# Patient Record
Sex: MALE | Race: WHITE | NOT HISPANIC OR LATINO | Employment: OTHER | URBAN - METROPOLITAN AREA
[De-identification: names, ages, dates, MRNs, and addresses within clinical notes are randomized per-mention and may not be internally consistent; named-entity substitution may affect disease eponyms.]

---

## 2024-03-24 DIAGNOSIS — E78.5 HYPERLIPIDEMIA, UNSPECIFIED HYPERLIPIDEMIA TYPE: Primary | ICD-10-CM

## 2024-03-25 RX ORDER — ATORVASTATIN CALCIUM 80 MG/1
80 TABLET, FILM COATED ORAL NIGHTLY
Qty: 90 TABLET | Refills: 3 | Status: SHIPPED | OUTPATIENT
Start: 2024-03-25

## 2024-07-11 ENCOUNTER — OFFICE VISIT (OUTPATIENT)
Dept: CARDIOLOGY | Facility: HOSPITAL | Age: 74
End: 2024-07-11
Payer: MEDICARE

## 2024-07-11 VITALS
DIASTOLIC BLOOD PRESSURE: 83 MMHG | SYSTOLIC BLOOD PRESSURE: 137 MMHG | BODY MASS INDEX: 25.2 KG/M2 | WEIGHT: 180 LBS | HEIGHT: 71 IN | HEART RATE: 73 BPM | OXYGEN SATURATION: 93 %

## 2024-07-11 DIAGNOSIS — E78.5 HYPERLIPIDEMIA, UNSPECIFIED HYPERLIPIDEMIA TYPE: Chronic | ICD-10-CM

## 2024-07-11 DIAGNOSIS — I25.2 HISTORY OF MYOCARDIAL INFARCTION: Primary | Chronic | ICD-10-CM

## 2024-07-11 PROCEDURE — G2211 COMPLEX E/M VISIT ADD ON: HCPCS | Performed by: INTERNAL MEDICINE

## 2024-07-11 PROCEDURE — 93005 ELECTROCARDIOGRAM TRACING: CPT | Performed by: INTERNAL MEDICINE

## 2024-07-11 PROCEDURE — 1036F TOBACCO NON-USER: CPT | Performed by: INTERNAL MEDICINE

## 2024-07-11 PROCEDURE — 99213 OFFICE O/P EST LOW 20 MIN: CPT | Performed by: INTERNAL MEDICINE

## 2024-07-11 PROCEDURE — 1159F MED LIST DOCD IN RCRD: CPT | Performed by: INTERNAL MEDICINE

## 2024-07-11 NOTE — PROGRESS NOTES
"Chief Complaint:   No chief complaint on file.     History Of Present Illness:    Luiz Banks is a 73 y.o. male here for followup. Hospitalized 9/2019 with an acute MI complicated by high grade AV block. Angiography was remarkable for RCA occlusion along with severe mid LAD disease. There was a heavy thrombus burden in his RCA which was dissolved by use of several days combination IIb/IIIa inhibitor, DAPT, and heparinization with subsequent PCI of his RCA and LAD. He was discharged on clopidogrel given dyspnea with ticagrelor. He did not tolerate Toprol.        He reports doing well and offers no cardiac complaints. Continues to bike several miles a week.         Last Recorded Vitals:  Vitals:    07/11/24 1316   BP: 137/83   Pulse: 73   SpO2: 93%   Weight: 81.6 kg (180 lb)   Height: 1.803 m (5' 11\")             Allergies:  Patient has no allergy information on record.    Outpatient Medications:  Current Outpatient Medications   Medication Instructions    atorvastatin (LIPITOR) 80 mg, oral, Nightly         Physical Exam:  Gen Well appearing septuagenarian male sitting up in NAD. Body mass index is 25.1 kg/m².   CV rrr. No m/r/g appreciated. No JVD or leg edema.   Pulm Lungs clear with normal respiratory effort.  Neuro Alert and conversant. Grossly nonfocal.       I reviewed the patient's ECG -  NSR. Inferior infarct.     I reviewed most recent imaging / labs / and office notes.      Assessment/Plan   1. CAD s/p MI s/p LAD/RCA PCI '19  Stable by available metrics. Indefinite aspirin / statin. Continued exercise encouraged.     2. Hyperlipidemia  On high dose statin. Last LDL at goal.     3. Health maintenance  PCP followup encouraged.     Followup 1 year                Shravan Ontiveros MD   "

## 2024-07-12 LAB
ATRIAL RATE: 73 BPM
P AXIS: 59 DEGREES
P OFFSET: 159 MS
P ONSET: 116 MS
PR INTERVAL: 190 MS
Q ONSET: 211 MS
QRS COUNT: 12 BEATS
QRS DURATION: 98 MS
QT INTERVAL: 388 MS
QTC CALCULATION(BAZETT): 427 MS
QTC FREDERICIA: 414 MS
R AXIS: -13 DEGREES
T AXIS: 58 DEGREES
T OFFSET: 405 MS
VENTRICULAR RATE: 73 BPM

## 2024-12-27 ENCOUNTER — APPOINTMENT (OUTPATIENT)
Dept: PRIMARY CARE | Facility: CLINIC | Age: 74
End: 2024-12-27
Payer: MEDICARE

## 2024-12-27 VITALS — HEART RATE: 96 BPM | OXYGEN SATURATION: 96 % | DIASTOLIC BLOOD PRESSURE: 76 MMHG | SYSTOLIC BLOOD PRESSURE: 130 MMHG

## 2024-12-27 DIAGNOSIS — Z48.02 VISIT FOR SUTURE REMOVAL: ICD-10-CM

## 2024-12-27 DIAGNOSIS — S01.01XA LACERATION OF SCALP, INITIAL ENCOUNTER: Primary | ICD-10-CM

## 2024-12-27 DIAGNOSIS — Z48.02 ENCOUNTER FOR STAPLE REMOVAL: ICD-10-CM

## 2024-12-27 ASSESSMENT — PATIENT HEALTH QUESTIONNAIRE - PHQ9
SUM OF ALL RESPONSES TO PHQ9 QUESTIONS 1 AND 2: 0
2. FEELING DOWN, DEPRESSED OR HOPELESS: NOT AT ALL
1. LITTLE INTEREST OR PLEASURE IN DOING THINGS: NOT AT ALL

## 2024-12-27 NOTE — PROGRESS NOTES
Subjective   Patient ID: Luiz Banks is a 74 y.o. male who presents for Suture / Staple Removal.  Today he is accompanied by alone.     HPI  Scalp Laceration   Previously seen at Urgent care on 12/18 due to laceration of scalp   He was working on his motorcycle when he lifted his head and then cut the top of his head against the license plate  His tetanus shot is up-to-date and received within the last 5 years  It was noted that he had a 15 cm curvilinear laceration over the right scalp with 2 arterial bleeders    2 stitches and 9 staples were used to close the wound   Presents today for stitch and suture removal     Current Outpatient Medications on File Prior to Visit   Medication Sig Dispense Refill    atorvastatin (Lipitor) 80 mg tablet TAKE 1 TABLET BY MOUTH AT BEDTIME 90 tablet 3     No current facility-administered medications on file prior to visit.        Allergies   Allergen Reactions    Sulfa (Sulfonamide Antibiotics) Anaphylaxis and Unknown       Immunization History   Administered Date(s) Administered    Pfizer COVID-19 vaccine, bivalent, age 12 years and older (30 mcg/0.3 mL) 09/30/2022    Pfizer Purple Cap SARS-CoV-2 03/04/2021, 04/01/2021, 10/30/2021         Review of Systems  All pertinent positive symptoms are included in the history of present illness.  All other systems have been reviewed and are negative and noncontributory to this patient's current ailments.     Objective   /76 (BP Location: Left arm, Patient Position: Sitting, BP Cuff Size: Adult)   Pulse 96   SpO2 96%   BSA: There is no height or weight on file to calculate BSA.  No visits with results within 1 Month(s) from this visit.   Latest known visit with results is:   Office Visit on 07/11/2024   Component Date Value Ref Range Status    Ventricular Rate 07/11/2024 73  BPM Final    Atrial Rate 07/11/2024 73  BPM Final    MN Interval 07/11/2024 190  ms Final    QRS Duration 07/11/2024 98  ms Final    QT Interval 07/11/2024  388  ms Final    QTC Calculation(Bazett) 07/11/2024 427  ms Final    P Axis 07/11/2024 59  degrees Final    R Axis 07/11/2024 -13  degrees Final    T Axis 07/11/2024 58  degrees Final    QRS Count 07/11/2024 12  beats Final    Q Onset 07/11/2024 211  ms Final    P Onset 07/11/2024 116  ms Final    P Offset 07/11/2024 159  ms Final    T Offset 07/11/2024 405  ms Final    QTC Fredericia 07/11/2024 414  ms Final       Physical Exam  CONSTITUTIONAL - well nourished, well developed, looks like stated age, in no acute distress, not ill-appearing, and not tired appearing  SKIN - normal skin color and pigmentation, normal skin turgor without rash, lesions, or nodules visualized  HEAD -noted healing sutures/staples approximately 9 cm in length  EYES - normal external exam  CHEST -no distressed breathing, good effort  EXTREMITIES - no edema, no deformities  NEUROLOGICAL - normal balance, normal motor, no ataxia  PSYCHIATRIC - alert, pleasant and cordial, age-appropriate    Procedure  2 sutures and 9 staples were removed carefully without complication; removal was tolerated well without complication; no wound dehiscence noted    Assessment/Plan   Scalp Laceration   9 staples and 2 sutures removed as noted above  Pressure dressing applied to ensure there is not excessive bleeding due to removal   You can remove the dressing this evening prior to going to bed   Keep your incision clean and dry until it is fully healed    Please follow-up for your yearly physical examination within the next 2-3 months

## 2025-02-22 DIAGNOSIS — E78.5 HYPERLIPIDEMIA, UNSPECIFIED HYPERLIPIDEMIA TYPE: ICD-10-CM

## 2025-02-24 RX ORDER — ATORVASTATIN CALCIUM 80 MG/1
80 TABLET, FILM COATED ORAL NIGHTLY
Qty: 90 TABLET | Refills: 3 | Status: SHIPPED | OUTPATIENT
Start: 2025-02-24 | End: 2026-02-24

## 2025-07-10 ENCOUNTER — OFFICE VISIT (OUTPATIENT)
Dept: CARDIOLOGY | Facility: HOSPITAL | Age: 75
End: 2025-07-10
Payer: MEDICARE

## 2025-07-10 VITALS
DIASTOLIC BLOOD PRESSURE: 78 MMHG | WEIGHT: 184 LBS | RESPIRATION RATE: 16 BRPM | OXYGEN SATURATION: 93 % | HEIGHT: 71 IN | HEART RATE: 73 BPM | SYSTOLIC BLOOD PRESSURE: 125 MMHG | BODY MASS INDEX: 25.76 KG/M2

## 2025-07-10 DIAGNOSIS — I25.2 HISTORY OF MYOCARDIAL INFARCTION: Primary | ICD-10-CM

## 2025-07-10 DIAGNOSIS — M79.10 MUSCLE SORENESS: ICD-10-CM

## 2025-07-10 DIAGNOSIS — E78.5 HYPERLIPIDEMIA, UNSPECIFIED HYPERLIPIDEMIA TYPE: ICD-10-CM

## 2025-07-10 PROCEDURE — 99214 OFFICE O/P EST MOD 30 MIN: CPT | Performed by: INTERNAL MEDICINE

## 2025-07-10 PROCEDURE — 93005 ELECTROCARDIOGRAM TRACING: CPT | Performed by: INTERNAL MEDICINE

## 2025-07-10 PROCEDURE — 1036F TOBACCO NON-USER: CPT | Performed by: INTERNAL MEDICINE

## 2025-07-10 PROCEDURE — G2211 COMPLEX E/M VISIT ADD ON: HCPCS | Performed by: INTERNAL MEDICINE

## 2025-07-10 PROCEDURE — 1159F MED LIST DOCD IN RCRD: CPT | Performed by: INTERNAL MEDICINE

## 2025-07-10 PROCEDURE — 99212 OFFICE O/P EST SF 10 MIN: CPT

## 2025-07-10 RX ORDER — ASPIRIN 81 MG/1
81 TABLET ORAL DAILY
COMMUNITY

## 2025-07-10 NOTE — PROGRESS NOTES
"Chief Complaint:   No chief complaint on file.     History Of Present Illness:    Luiz Banks is a 74 y.o. male here for followup. Hospitalized 9/2019 with an acute MI complicated by high grade AV block. Angiography was remarkable for RCA occlusion along with severe mid LAD disease. There was a heavy thrombus burden in his RCA which was dissolved by use of several days combination IIb/IIIa inhibitor, DAPT, and heparinization with subsequent PCI of his RCA and LAD. He was discharged on clopidogrel given dyspnea with ticagrelor. He did not tolerate Toprol.        He reports doing well and offers no cardiac complaints. Continues to bike several miles a week but has had issues related to the weather or life tasks keeping him from biking as much as he usual does. Notes some joint and muscle (left shoulder) soreness which seems to correlate with his recent atorvastatin bottle which is due to run out soon.        Last Recorded Vitals:  Vitals:    07/10/25 1402   BP: 125/78   BP Location: Left arm   Patient Position: Sitting   Pulse: 73   Resp: 16   SpO2: 93%   Weight: 83.5 kg (184 lb)   Height: 1.803 m (5' 11\")             Allergies:  Sulfa (sulfonamide antibiotics)    Outpatient Medications:  Current Outpatient Medications   Medication Instructions    aspirin 81 mg, Daily    atorvastatin (LIPITOR) 80 mg, oral, Nightly         Physical Exam:  Gen Well appearing septuagenarian male sitting up in NAD. Body mass index is 25.66 kg/m².   CV rrr. No m/r/g appreciated. No JVD or leg edema.   Pulm Lungs clear with normal respiratory effort.  Neuro Alert and conversant. Grossly nonfocal.       I reviewed the patient's ECG -  NSR.      I reviewed most recent imaging / labs / and office notes.      Assessment/Plan   1. CAD s/p MI s/p LAD/RCA PCI '19  Stable by available metrics. Indefinite aspirin / statin. Continued exercise encouraged.     2. Hyperlipidemia  On high dose statin. Last LDL at goal. He is to get repeat labs next " week.     3. Muscle soreness   Potentially statin induced. He is to monitor his symptoms on his new refill and will follow-up his cholesterol numbers. If he remains symptomatic with consider lower dose statin if his LDL allows for it vs. Lower dose statin and added ezetimibe or [id number].     Follow-up 1 year                Shravan Ontiveros MD

## 2025-07-11 LAB
ATRIAL RATE: 73 BPM
P AXIS: 46 DEGREES
P OFFSET: 192 MS
P ONSET: 126 MS
PR INTERVAL: 204 MS
Q ONSET: 228 MS
QRS COUNT: 12 BEATS
QRS DURATION: 96 MS
QT INTERVAL: 374 MS
QTC CALCULATION(BAZETT): 412 MS
QTC FREDERICIA: 399 MS
R AXIS: 97 DEGREES
T AXIS: 44 DEGREES
T OFFSET: 415 MS
VENTRICULAR RATE: 73 BPM

## 2025-07-14 ENCOUNTER — APPOINTMENT (OUTPATIENT)
Dept: PRIMARY CARE | Facility: CLINIC | Age: 75
End: 2025-07-14
Payer: MEDICARE

## 2025-07-14 VITALS
HEIGHT: 71 IN | HEART RATE: 88 BPM | WEIGHT: 182.6 LBS | DIASTOLIC BLOOD PRESSURE: 74 MMHG | OXYGEN SATURATION: 95 % | SYSTOLIC BLOOD PRESSURE: 120 MMHG | BODY MASS INDEX: 25.56 KG/M2

## 2025-07-14 DIAGNOSIS — Z13.1 SCREENING FOR DIABETES MELLITUS: ICD-10-CM

## 2025-07-14 DIAGNOSIS — E78.5 HYPERLIPIDEMIA, UNSPECIFIED HYPERLIPIDEMIA TYPE: ICD-10-CM

## 2025-07-14 DIAGNOSIS — Z12.5 PROSTATE CANCER SCREENING: ICD-10-CM

## 2025-07-14 DIAGNOSIS — Z13.6 SCREENING FOR HEART DISEASE: ICD-10-CM

## 2025-07-14 DIAGNOSIS — Z13.29 SCREENING FOR THYROID DISORDER: ICD-10-CM

## 2025-07-14 DIAGNOSIS — Z00.00 ENCOUNTER FOR MEDICARE ANNUAL WELLNESS EXAM: Primary | ICD-10-CM

## 2025-07-14 DIAGNOSIS — Z13.0 SCREENING FOR DISORDER OF BLOOD AND BLOOD-FORMING ORGANS: ICD-10-CM

## 2025-07-14 DIAGNOSIS — Z00.00 HEALTHCARE MAINTENANCE: ICD-10-CM

## 2025-07-14 PROCEDURE — 1124F ACP DISCUSS-NO DSCNMKR DOCD: CPT | Performed by: STUDENT IN AN ORGANIZED HEALTH CARE EDUCATION/TRAINING PROGRAM

## 2025-07-14 PROCEDURE — 1170F FXNL STATUS ASSESSED: CPT | Performed by: STUDENT IN AN ORGANIZED HEALTH CARE EDUCATION/TRAINING PROGRAM

## 2025-07-14 PROCEDURE — 1159F MED LIST DOCD IN RCRD: CPT | Performed by: STUDENT IN AN ORGANIZED HEALTH CARE EDUCATION/TRAINING PROGRAM

## 2025-07-14 PROCEDURE — 1036F TOBACCO NON-USER: CPT | Performed by: STUDENT IN AN ORGANIZED HEALTH CARE EDUCATION/TRAINING PROGRAM

## 2025-07-14 PROCEDURE — 99397 PER PM REEVAL EST PAT 65+ YR: CPT | Performed by: STUDENT IN AN ORGANIZED HEALTH CARE EDUCATION/TRAINING PROGRAM

## 2025-07-14 PROCEDURE — G0439 PPPS, SUBSEQ VISIT: HCPCS | Performed by: STUDENT IN AN ORGANIZED HEALTH CARE EDUCATION/TRAINING PROGRAM

## 2025-07-14 ASSESSMENT — PATIENT HEALTH QUESTIONNAIRE - PHQ9
SUM OF ALL RESPONSES TO PHQ9 QUESTIONS 1 AND 2: 0
1. LITTLE INTEREST OR PLEASURE IN DOING THINGS: NOT AT ALL
2. FEELING DOWN, DEPRESSED OR HOPELESS: NOT AT ALL

## 2025-07-14 ASSESSMENT — ENCOUNTER SYMPTOMS
LOSS OF SENSATION IN FEET: 0
DEPRESSION: 0
OCCASIONAL FEELINGS OF UNSTEADINESS: 0

## 2025-07-14 ASSESSMENT — ACTIVITIES OF DAILY LIVING (ADL)
DRESSING: INDEPENDENT
GROCERY_SHOPPING: INDEPENDENT
BATHING: INDEPENDENT
TAKING_MEDICATION: INDEPENDENT
MANAGING_FINANCES: INDEPENDENT
DOING_HOUSEWORK: INDEPENDENT

## 2025-07-14 NOTE — PROGRESS NOTES
Subjective   Reason for Visit: Luiz Banks is an 74 y.o. male here for a Medicare Wellness visit.          Reviewed all medications by prescribing practitioner or clinical pharmacist (such as prescriptions, OTCs, herbal therapies and supplements) and documented in the medical record.    HPI  1.  MCR/healthcare maintenance  Overall feels well without any major issues/complaints  Prevnar 13 provided in 2020  Willing to discuss PCV 20 in the future  Ultimately did a colonoscopy at the age of 58 and does not wish to do any further screenings  Denies any tobacco and drinks EtOH socially  Denies any acute signs/symptoms and feels well  Eats a well-balanced diet and continues to cycle 1-3000+ miles yearly  He is fasting in preparation have lab work performed    2.  CAD/history of non-ST elevation MI/hyperlipidemia  Continues to follow-up with cardiologist  Continues to take high-dose atorvastatin 80 mg daily  Has a history of having a cardiac event 5+ years ago but otherwise feels well  Does not need refills at this time    Allergies[1]    Immunization History   Administered Date(s) Administered    Flu vaccine, trivalent, preservative free, HIGH-DOSE, age 65y+ (Fluzone) 11/17/2020    Pfizer COVID-19 vaccine, bivalent, age 12 years and older (30 mcg/0.3 mL) 09/30/2022    Pfizer Purple Cap SARS-CoV-2 03/04/2021, 04/01/2021, 10/30/2021    Pneumococcal conjugate vaccine, 13-valent (PREVNAR 13) 11/17/2020       Patient Self Assessment of Health Status  Patient Self Assessment: Excellent    Nutrition and Exercise  Current Diet: Well Balanced Diet  Adequate Fluid Intake: No  Caffeine: Yes  Exercise Frequency: Regularly    Functional Ability/Level of Safety  Cognitive Impairment Observed: No cognitive impairment observed    Home Safety Risk Factors: None    Patient Care Team:  Rissa Flores DO as PCP - General  Shravan Ontiveros MD as PCP - [employer] PCP     Review of Systems  All pertinent positive symptoms  "are included in the history of present illness.    All other systems have been reviewed and are negative and noncontributory to this patient's current ailments.    Objective   Vitals:  /74 (BP Location: Left arm, Patient Position: Sitting, BP Cuff Size: Adult)   Pulse 88   Ht 1.803 m (5' 11\")   Wt 82.8 kg (182 lb 9.6 oz)   SpO2 95%   BMI 25.47 kg/m²     Office Visit on 07/10/2025   Component Date Value Ref Range Status    Ventricular Rate 07/10/2025 73  BPM Final    Atrial Rate 07/10/2025 73  BPM Final    GA Interval 07/10/2025 204  ms Final    QRS Duration 07/10/2025 96  ms Final    QT Interval 07/10/2025 374  ms Final    QTC Calculation(Bazett) 07/10/2025 412  ms Final    P Axis 07/10/2025 46  degrees Final    R Axis 07/10/2025 97  degrees Final    T Axis 07/10/2025 44  degrees Final    QRS Count 07/10/2025 12  beats Final    Q Onset 07/10/2025 228  ms Final    P Onset 07/10/2025 126  ms Final    P Offset 07/10/2025 192  ms Final    T Offset 07/10/2025 415  ms Final    QTC Fredericia 07/10/2025 399  ms Final       Physical Exam  CONSTITUTIONAL - well nourished, well developed, looks like stated age, in no acute distress, not ill-appearing, and not tired appearing  SKIN - normal skin color and pigmentation, normal skin turgor without rash, lesions, or nodules visualized  HEAD - no trauma, normocephalic  EYES - normal external exam  ENT - TM's intact, no injection, no signs of infection, uvula midline, normal tongue movement and throat normal, no exudate, nasal passage without discharge and patent  NECK - supple without rigidity, no neck mass was observed, no thyromegaly or thyroid nodules  CHEST - clear to auscultation, no wheezing, no crackles and no rales, good effort  CARDIAC - regular rate and regular rhythm, no skipped beats, no murmur  ABDOMEN - no organomegaly, soft, nontender, nondistended, normal bowel sounds, no guarding/rebound/rigidity, negative McBurney sign and negative Garcia " sign  EXTREMITIES - no edema, no deformities  NEUROLOGICAL - normal gait, normal balance, normal motor, no ataxia  PSYCHIATRIC - alert, pleasant and cordial, age-appropriate  IMMUNOLOGIC - no cervical lymphadenopathy    Assessment/Plan   Problem List Items Addressed This Visit       Hyperlipidemia    Relevant Orders    Prostate Specific Antigen    TSH with reflex to Free T4 if abnormal    Lipid Panel    Comprehensive Metabolic Panel    CBC and Auto Differential    Hemoglobin A1C     Other Visit Diagnoses         Encounter for Medicare annual wellness exam    -  Primary    Relevant Orders    Prostate Specific Antigen    TSH with reflex to Free T4 if abnormal    Lipid Panel    Comprehensive Metabolic Panel    CBC and Auto Differential    Hemoglobin A1C      Healthcare maintenance        Relevant Orders    Prostate Specific Antigen    TSH with reflex to Free T4 if abnormal    Lipid Panel    Comprehensive Metabolic Panel    CBC and Auto Differential    Hemoglobin A1C      Prostate cancer screening        Relevant Orders    Prostate Specific Antigen    TSH with reflex to Free T4 if abnormal    Lipid Panel    Comprehensive Metabolic Panel    CBC and Auto Differential    Hemoglobin A1C      Screening for thyroid disorder        Relevant Orders    Prostate Specific Antigen    TSH with reflex to Free T4 if abnormal    Lipid Panel    Comprehensive Metabolic Panel    CBC and Auto Differential    Hemoglobin A1C      Screening for heart disease        Relevant Orders    Prostate Specific Antigen    TSH with reflex to Free T4 if abnormal    Lipid Panel    Comprehensive Metabolic Panel    CBC and Auto Differential    Hemoglobin A1C      Screening for disorder of blood and blood-forming organs        Relevant Orders    Prostate Specific Antigen    TSH with reflex to Free T4 if abnormal    Lipid Panel    Comprehensive Metabolic Panel    CBC and Auto Differential    Hemoglobin A1C      Screening for diabetes mellitus        Relevant  Orders    Prostate Specific Antigen    TSH with reflex to Free T4 if abnormal    Lipid Panel    Comprehensive Metabolic Panel    CBC and Auto Differential    Hemoglobin A1C        1.  Healthcare maintenance/[facility]  A complete Medicare annual wellness examination as well as a physical examination was performed  Continue eating a well-balanced diet and exercising regularly  Lab work ordered and we will notify the results and make recommendations accordingly  Please obtain your PCV 20 at your earliest convenience at the pharmacy as well as consider the RSV vaccine    2.  Hyperlipidemia/CAD  Lab work ordered and we will notify of the results and make recommendations accordingly  Otherwise continue following up with your cardiologist per their protocol              [1]   Allergies  Allergen Reactions    Sulfa (Sulfonamide Antibiotics) Anaphylaxis and Unknown

## 2025-07-15 LAB
ALBUMIN SERPL-MCNC: 4.4 G/DL (ref 3.6–5.1)
ALP SERPL-CCNC: 66 U/L (ref 35–144)
ALT SERPL-CCNC: 24 U/L (ref 9–46)
ANION GAP SERPL CALCULATED.4IONS-SCNC: 9 MMOL/L (CALC) (ref 7–17)
AST SERPL-CCNC: 27 U/L (ref 10–35)
BASOPHILS # BLD AUTO: 38 CELLS/UL (ref 0–200)
BASOPHILS NFR BLD AUTO: 0.8 %
BILIRUB SERPL-MCNC: 0.8 MG/DL (ref 0.2–1.2)
BUN SERPL-MCNC: 25 MG/DL (ref 7–25)
CALCIUM SERPL-MCNC: 9.7 MG/DL (ref 8.6–10.3)
CHLORIDE SERPL-SCNC: 104 MMOL/L (ref 98–110)
CHOLEST SERPL-MCNC: 132 MG/DL
CHOLEST/HDLC SERPL: 2.2 (CALC)
CO2 SERPL-SCNC: 27 MMOL/L (ref 20–32)
CREAT SERPL-MCNC: 0.94 MG/DL (ref 0.7–1.28)
EGFRCR SERPLBLD CKD-EPI 2021: 84 ML/MIN/1.73M2
EOSINOPHIL # BLD AUTO: 110 CELLS/UL (ref 15–500)
EOSINOPHIL NFR BLD AUTO: 2.3 %
ERYTHROCYTE [DISTWIDTH] IN BLOOD BY AUTOMATED COUNT: 13.1 % (ref 11–15)
EST. AVERAGE GLUCOSE BLD GHB EST-MCNC: 111 MG/DL
EST. AVERAGE GLUCOSE BLD GHB EST-SCNC: 6.2 MMOL/L
GLUCOSE SERPL-MCNC: 86 MG/DL (ref 65–99)
HBA1C MFR BLD: 5.5 %
HCT VFR BLD AUTO: 48 % (ref 38.5–50)
HDLC SERPL-MCNC: 59 MG/DL
HGB BLD-MCNC: 15.6 G/DL (ref 13.2–17.1)
LDLC SERPL CALC-MCNC: 59 MG/DL (CALC)
LYMPHOCYTES # BLD AUTO: 1205 CELLS/UL (ref 850–3900)
LYMPHOCYTES NFR BLD AUTO: 25.1 %
MCH RBC QN AUTO: 31 PG (ref 27–33)
MCHC RBC AUTO-ENTMCNC: 32.5 G/DL (ref 32–36)
MCV RBC AUTO: 95.4 FL (ref 80–100)
MONOCYTES # BLD AUTO: 418 CELLS/UL (ref 200–950)
MONOCYTES NFR BLD AUTO: 8.7 %
NEUTROPHILS # BLD AUTO: 3029 CELLS/UL (ref 1500–7800)
NEUTROPHILS NFR BLD AUTO: 63.1 %
NONHDLC SERPL-MCNC: 73 MG/DL (CALC)
PLATELET # BLD AUTO: 236 THOUSAND/UL (ref 140–400)
PMV BLD REES-ECKER: 10.3 FL (ref 7.5–12.5)
POTASSIUM SERPL-SCNC: 4.8 MMOL/L (ref 3.5–5.3)
PROT SERPL-MCNC: 6.6 G/DL (ref 6.1–8.1)
PSA SERPL-MCNC: 15.3 NG/ML
RBC # BLD AUTO: 5.03 MILLION/UL (ref 4.2–5.8)
SODIUM SERPL-SCNC: 140 MMOL/L (ref 135–146)
TRIGL SERPL-MCNC: 56 MG/DL
TSH SERPL-ACNC: 2.37 MIU/L (ref 0.4–4.5)
WBC # BLD AUTO: 4.8 THOUSAND/UL (ref 3.8–10.8)

## 2025-07-15 NOTE — RESULT ENCOUNTER NOTE
PSA is elevated at 15.3  I know the patient has a history of an elevated PSA  Has the patient followed up with urology in the past  If not, we need them to follow-up at his earliest convenience    Thyroid within normal limits    Cholesterol looks great with an LDL of 59    Sugar, kidneys, liver, electrolytes are all within normal limits    Complete blood cell count shows no anemia    Hemoglobin A1c within normal limits

## 2025-07-17 ENCOUNTER — RESULTS FOLLOW-UP (OUTPATIENT)
Dept: PRIMARY CARE | Facility: CLINIC | Age: 75
End: 2025-07-17
Payer: MEDICARE

## 2025-07-17 NOTE — TELEPHONE ENCOUNTER
----- Message from Rissa Flores sent at 7/15/2025  4:28 PM EDT -----  PSA is elevated at 15.3  I know the patient has a history of an elevated PSA  Has the patient followed up with urology in the past  If not, we need them to follow-up at his earliest convenience    Thyroid within normal limits    Cholesterol looks great with an LDL of 59    Sugar, kidneys, liver, electrolytes are all within normal limits    Complete blood cell count shows no anemia    Hemoglobin A1c within normal limits      LVM for pt, pt aware    ----- Message -----  From: Luz ElenaDefinigen Results In  Sent: 7/15/2025   8:35 AM EDT  To: Rissa Flores, DO